# Patient Record
Sex: FEMALE | Race: BLACK OR AFRICAN AMERICAN | NOT HISPANIC OR LATINO | Employment: FULL TIME | URBAN - NONMETROPOLITAN AREA
[De-identification: names, ages, dates, MRNs, and addresses within clinical notes are randomized per-mention and may not be internally consistent; named-entity substitution may affect disease eponyms.]

---

## 2022-10-30 ENCOUNTER — APPOINTMENT (EMERGENCY)
Dept: CT IMAGING | Facility: HOSPITAL | Age: 47
End: 2022-10-30

## 2022-10-30 ENCOUNTER — APPOINTMENT (EMERGENCY)
Dept: RADIOLOGY | Facility: HOSPITAL | Age: 47
End: 2022-10-30

## 2022-10-30 ENCOUNTER — HOSPITAL ENCOUNTER (OUTPATIENT)
Facility: HOSPITAL | Age: 47
Setting detail: OBSERVATION
Discharge: HOME | End: 2022-10-31
Attending: EMERGENCY MEDICINE | Admitting: INTERNAL MEDICINE

## 2022-10-30 DIAGNOSIS — J45.901 EXACERBATION OF ASTHMA, UNSPECIFIED ASTHMA SEVERITY, UNSPECIFIED WHETHER PERSISTENT: Primary | ICD-10-CM

## 2022-10-30 PROBLEM — J45.41 MODERATE PERSISTENT ASTHMA WITH (ACUTE) EXACERBATION: Status: ACTIVE | Noted: 2022-10-30

## 2022-10-30 PROBLEM — R65.10 SIRS (SYSTEMIC INFLAMMATORY RESPONSE SYNDROME) (HCC): Status: ACTIVE | Noted: 2022-10-30

## 2022-10-30 PROBLEM — J96.01 ACUTE RESPIRATORY FAILURE WITH HYPOXIA (HCC): Status: ACTIVE | Noted: 2022-10-30

## 2022-10-30 PROBLEM — R73.9 HYPERGLYCEMIA: Status: ACTIVE | Noted: 2022-10-30

## 2022-10-30 LAB
ALBUMIN SERPL BCP-MCNC: 4 G/DL (ref 3.5–5)
ALP SERPL-CCNC: 118 U/L (ref 46–116)
ALT SERPL W P-5'-P-CCNC: 15 U/L (ref 12–78)
ANION GAP SERPL CALCULATED.3IONS-SCNC: 9 MMOL/L (ref 4–13)
AST SERPL W P-5'-P-CCNC: 18 U/L (ref 5–45)
BILIRUB SERPL-MCNC: 0.54 MG/DL (ref 0.2–1)
BUN SERPL-MCNC: 6 MG/DL (ref 5–25)
CALCIUM SERPL-MCNC: 9.6 MG/DL (ref 8.3–10.1)
CHLORIDE SERPL-SCNC: 102 MMOL/L (ref 96–108)
CO2 SERPL-SCNC: 26 MMOL/L (ref 21–32)
CREAT SERPL-MCNC: 0.92 MG/DL (ref 0.6–1.3)
ERYTHROCYTE [DISTWIDTH] IN BLOOD BY AUTOMATED COUNT: 14.6 % (ref 11.6–15.1)
FLUAV RNA RESP QL NAA+PROBE: NEGATIVE
FLUBV RNA RESP QL NAA+PROBE: NEGATIVE
GFR SERPL CREATININE-BSD FRML MDRD: 75 ML/MIN/1.73SQ M
GLUCOSE SERPL-MCNC: 169 MG/DL (ref 65–140)
HCT VFR BLD AUTO: 37.1 % (ref 34.8–46.1)
HGB BLD-MCNC: 13.3 G/DL (ref 11.5–15.4)
LACTATE SERPL-SCNC: 1.3 MMOL/L (ref 0.5–2)
LYMPHOCYTES # BLD AUTO: 6 % (ref 14–44)
MCH RBC QN AUTO: 29.5 PG (ref 26.8–34.3)
MCHC RBC AUTO-ENTMCNC: 35.8 G/DL (ref 31.4–37.4)
MCV RBC AUTO: 82 FL (ref 82–98)
MONOCYTES NFR BLD: 2 % (ref 4–12)
NEUTS SEG NFR BLD AUTO: 92 % (ref 43–75)
PLATELET # BLD AUTO: 486 THOUSANDS/UL (ref 149–390)
PLATELET # BLD AUTO: 514 THOUSANDS/UL (ref 149–390)
PLATELET BLD QL SMEAR: ADEQUATE
PMV BLD AUTO: 9.2 FL (ref 8.9–12.7)
PMV BLD AUTO: 9.5 FL (ref 8.9–12.7)
POTASSIUM SERPL-SCNC: 4.2 MMOL/L (ref 3.5–5.3)
PROCALCITONIN SERPL-MCNC: <0.05 NG/ML
PROT SERPL-MCNC: 7.8 G/DL (ref 6.4–8.4)
RBC # BLD AUTO: 4.51 MILLION/UL (ref 3.81–5.12)
RBC MORPH BLD: NORMAL
RSV RNA RESP QL NAA+PROBE: NEGATIVE
SARS-COV-2 RNA RESP QL NAA+PROBE: NEGATIVE
SODIUM SERPL-SCNC: 137 MMOL/L (ref 135–147)
TOTAL CELLS COUNTED SPEC: 100
WBC # BLD AUTO: 21.95 THOUSAND/UL (ref 4.31–10.16)

## 2022-10-30 RX ORDER — SODIUM CHLORIDE FOR INHALATION 0.9 %
3 VIAL, NEBULIZER (ML) INHALATION ONCE
Status: COMPLETED | OUTPATIENT
Start: 2022-10-30 | End: 2022-10-30

## 2022-10-30 RX ORDER — ACETAMINOPHEN 325 MG/1
650 TABLET ORAL EVERY 6 HOURS PRN
Status: DISCONTINUED | OUTPATIENT
Start: 2022-10-30 | End: 2022-10-31 | Stop reason: HOSPADM

## 2022-10-30 RX ORDER — ENOXAPARIN SODIUM 100 MG/ML
40 INJECTION SUBCUTANEOUS DAILY
Status: DISCONTINUED | OUTPATIENT
Start: 2022-10-31 | End: 2022-10-31 | Stop reason: HOSPADM

## 2022-10-30 RX ORDER — MONTELUKAST SODIUM 10 MG/1
10 TABLET ORAL
COMMUNITY
End: 2022-10-31 | Stop reason: SDUPTHER

## 2022-10-30 RX ORDER — LORATADINE 10 MG/1
10 TABLET ORAL DAILY
COMMUNITY

## 2022-10-30 RX ORDER — LEVALBUTEROL 1.25 MG/.5ML
1.25 SOLUTION, CONCENTRATE RESPIRATORY (INHALATION)
Status: DISCONTINUED | OUTPATIENT
Start: 2022-10-30 | End: 2022-10-31 | Stop reason: HOSPADM

## 2022-10-30 RX ORDER — GUAIFENESIN 600 MG/1
600 TABLET, EXTENDED RELEASE ORAL 2 TIMES DAILY
Status: DISCONTINUED | OUTPATIENT
Start: 2022-10-30 | End: 2022-10-31 | Stop reason: HOSPADM

## 2022-10-30 RX ORDER — ALBUTEROL SULFATE 2.5 MG/3ML
2.5 SOLUTION RESPIRATORY (INHALATION) EVERY 2 HOUR PRN
Status: DISCONTINUED | OUTPATIENT
Start: 2022-10-30 | End: 2022-10-31 | Stop reason: HOSPADM

## 2022-10-30 RX ORDER — FERROUS SULFATE 325(65) MG
325 TABLET ORAL
COMMUNITY

## 2022-10-30 RX ORDER — SODIUM CHLORIDE FOR INHALATION 0.9 %
3 VIAL, NEBULIZER (ML) INHALATION
Status: DISCONTINUED | OUTPATIENT
Start: 2022-10-30 | End: 2022-10-30

## 2022-10-30 RX ORDER — METHYLPREDNISOLONE SODIUM SUCCINATE 40 MG/ML
40 INJECTION, POWDER, LYOPHILIZED, FOR SOLUTION INTRAMUSCULAR; INTRAVENOUS EVERY 6 HOURS SCHEDULED
Status: DISCONTINUED | OUTPATIENT
Start: 2022-10-30 | End: 2022-10-31

## 2022-10-30 RX ORDER — BUDESONIDE AND FORMOTEROL FUMARATE DIHYDRATE 80; 4.5 UG/1; UG/1
2 AEROSOL RESPIRATORY (INHALATION)
Status: DISCONTINUED | OUTPATIENT
Start: 2022-10-30 | End: 2022-10-31 | Stop reason: HOSPADM

## 2022-10-30 RX ORDER — MONTELUKAST SODIUM 10 MG/1
10 TABLET ORAL
Status: DISCONTINUED | OUTPATIENT
Start: 2022-10-30 | End: 2022-10-31 | Stop reason: HOSPADM

## 2022-10-30 RX ORDER — ALBUTEROL SULFATE 2.5 MG/3ML
2.5 SOLUTION RESPIRATORY (INHALATION) EVERY 6 HOURS PRN
COMMUNITY
End: 2022-10-31 | Stop reason: SDUPTHER

## 2022-10-30 RX ADMIN — IOHEXOL 100 ML: 350 INJECTION, SOLUTION INTRAVENOUS at 16:10

## 2022-10-30 RX ADMIN — MONTELUKAST 10 MG: 10 TABLET, FILM COATED ORAL at 22:01

## 2022-10-30 RX ADMIN — LEVALBUTEROL HYDROCHLORIDE 1.25 MG: 1.25 SOLUTION, CONCENTRATE RESPIRATORY (INHALATION) at 20:07

## 2022-10-30 RX ADMIN — ALBUTEROL SULFATE 2.5 MG: 2.5 SOLUTION RESPIRATORY (INHALATION) at 18:52

## 2022-10-30 RX ADMIN — METHYLPREDNISOLONE SODIUM SUCCINATE 40 MG: 40 INJECTION, POWDER, FOR SOLUTION INTRAMUSCULAR; INTRAVENOUS at 23:32

## 2022-10-30 RX ADMIN — IPRATROPIUM BROMIDE 1 MG: 0.5 SOLUTION RESPIRATORY (INHALATION) at 14:19

## 2022-10-30 RX ADMIN — METHYLPREDNISOLONE SODIUM SUCCINATE 40 MG: 40 INJECTION, POWDER, FOR SOLUTION INTRAMUSCULAR; INTRAVENOUS at 18:00

## 2022-10-30 RX ADMIN — GUAIFENESIN 600 MG: 600 TABLET, EXTENDED RELEASE ORAL at 18:00

## 2022-10-30 RX ADMIN — IPRATROPIUM BROMIDE 0.5 MG: 0.5 SOLUTION RESPIRATORY (INHALATION) at 20:07

## 2022-10-30 RX ADMIN — ALBUTEROL SULFATE 10 MG: 2.5 SOLUTION RESPIRATORY (INHALATION) at 14:19

## 2022-10-30 RX ADMIN — BUDESONIDE AND FORMOTEROL FUMARATE DIHYDRATE 2 PUFF: 80; 4.5 AEROSOL RESPIRATORY (INHALATION) at 20:05

## 2022-10-30 RX ADMIN — ALBUTEROL SULFATE 2.5 MG: 2.5 SOLUTION RESPIRATORY (INHALATION) at 22:01

## 2022-10-30 RX ADMIN — ACETAMINOPHEN 650 MG: 325 TABLET ORAL at 18:34

## 2022-10-30 RX ADMIN — ISODIUM CHLORIDE 3 ML: 0.03 SOLUTION RESPIRATORY (INHALATION) at 14:19

## 2022-10-30 NOTE — ASSESSMENT & PLAN NOTE
Present on admission as evidenced by tachycardia, tachypnea, leukocytosis  COVID influenza and RSV swab negative on admission  CT chest shows  Likely in the setting of acute asthma exacerbation    Leukocytosis can be secondary to recent IV steroids patient received in the emergency room yesterday  Continue to follow CBC temperature curve closely  Will get lactic acid and procalcitonin, continue to monitor off IV antibiotics

## 2022-10-30 NOTE — ASSESSMENT & PLAN NOTE
Patient has history of asthma  Maintained on Singulair, albuterol inhaler,  Does not follow up with pulmonologist  Was in the emergency room at [facility] yesterday with exacerbation  Received IV steroids and discharged on tapering prednisone and azithromycin  Presents back with worsening shortness of breath and cough  Chest x-ray on admission without acute infiltrate  CT chest shows:  No pulmonary embolism  Right middle lobe groundglass opacities can represent atelectasis or (viral) pneumonia  Continue with scheduled IV Solu-Medrol, scheduled DuoNebs q 6 hours, Singulair,  Will benefit from outpatient pulmonary follow-up

## 2022-10-30 NOTE — ED PROVIDER NOTES
History  Chief Complaint   Patient presents with   • Shortness of Breath     Pt states asthma attack started yesterday  Went to hospital yesterday but still sob today  Patient with history of asthma, complains of 2 days of wheezing, coughing, shortness of breath  Went yesterday to [facility] where she was administered nebulization and steroid, discharged after same and prescribed Z-Eriberto and additional steroid  However, continues to complain of coughing and significant shortness of breath and wheezing, returns to the emergency room for re-evaluation  Denies fevers or chills  Denies chest pain  History provided by:  Patient   used: No    Shortness of Breath  Severity:  Moderate  Onset quality:  Gradual  Duration:  2 days  Timing:  Constant  Progression:  Unchanged  Chronicity:  New  Relieved by:  Nothing  Worsened by:  Nothing  Ineffective treatments: nebs/steroids  Associated symptoms: cough and wheezing    Associated symptoms: no abdominal pain, no chest pain, no ear pain, no fever, no headaches, no hemoptysis, no neck pain, no rash, no sore throat, no syncope and no vomiting        None       Past Medical History:   Diagnosis Date   • Asthma        Past Surgical History:   Procedure Laterality Date   • CHOLECYSTECTOMY     • GASTRIC BYPASS         History reviewed  No pertinent family history  I have reviewed and agree with the history as documented  E-Cigarette/Vaping   • E-Cigarette Use Never User      E-Cigarette/Vaping Substances     Social History     Tobacco Use   • Smoking status: Never Smoker   • Smokeless tobacco: Never Used   Vaping Use   • Vaping Use: Never used   Substance Use Topics   • Alcohol use: Yes   • Drug use: Yes     Types: Marijuana       Review of Systems   Constitutional: Negative for chills and fever  HENT: Negative for ear pain, hearing loss, sore throat, trouble swallowing and voice change  Eyes: Negative for pain and discharge  Respiratory: Positive for cough, shortness of breath and wheezing  Negative for hemoptysis  Cardiovascular: Negative for chest pain, palpitations and syncope  Gastrointestinal: Negative for abdominal pain, blood in stool, constipation, diarrhea, nausea and vomiting  Genitourinary: Negative for dysuria, flank pain, frequency and hematuria  Musculoskeletal: Negative for joint swelling, neck pain and neck stiffness  Skin: Negative for rash and wound  Neurological: Negative for dizziness, seizures, syncope, facial asymmetry and headaches  Psychiatric/Behavioral: Negative for hallucinations, self-injury and suicidal ideas  All other systems reviewed and are negative  Physical Exam  Physical Exam  Vitals and nursing note reviewed  Constitutional:       General: She is not in acute distress  Appearance: She is well-developed  HENT:      Head: Normocephalic and atraumatic  Right Ear: External ear normal       Left Ear: External ear normal    Eyes:      General: No scleral icterus  Right eye: No discharge  Left eye: No discharge  Extraocular Movements: Extraocular movements intact  Conjunctiva/sclera: Conjunctivae normal    Cardiovascular:      Rate and Rhythm: Normal rate and regular rhythm  Heart sounds: Normal heart sounds  No murmur heard  Pulmonary:      Effort: Pulmonary effort is normal       Breath sounds: Examination of the right-upper field reveals decreased breath sounds and wheezing  Examination of the left-upper field reveals decreased breath sounds and wheezing  Examination of the right-middle field reveals decreased breath sounds and wheezing  Examination of the left-middle field reveals decreased breath sounds and wheezing  Examination of the right-lower field reveals decreased breath sounds and wheezing  Examination of the left-lower field reveals decreased breath sounds and wheezing  Decreased breath sounds and wheezing present   No rhonchi or rales  Abdominal:      General: Bowel sounds are normal  There is no distension  Palpations: Abdomen is soft  Tenderness: There is no abdominal tenderness  There is no guarding or rebound  Musculoskeletal:         General: No deformity  Normal range of motion  Cervical back: Normal range of motion and neck supple  Skin:     General: Skin is warm and dry  Findings: No rash  Neurological:      General: No focal deficit present  Mental Status: She is alert and oriented to person, place, and time  Cranial Nerves: No cranial nerve deficit  Psychiatric:         Mood and Affect: Mood normal          Behavior: Behavior normal          Thought Content:  Thought content normal          Judgment: Judgment normal          Vital Signs  ED Triage Vitals   Temperature Pulse Respirations Blood Pressure SpO2   10/30/22 1409 10/30/22 1409 10/30/22 1409 10/30/22 1410 10/30/22 1409   97 6 °F (36 4 °C) (!) 130 (!) 30 (!) 187/108 (!) 89 %      Temp Source Heart Rate Source Patient Position - Orthostatic VS BP Location FiO2 (%)   10/30/22 1409 10/30/22 1409 -- -- --   Temporal Monitor         Pain Score       10/30/22 1500       No Pain           Vitals:    10/30/22 1409 10/30/22 1410 10/30/22 1415 10/30/22 1500   BP:  (!) 187/108 (!) 187/108 149/80   Pulse: (!) 130  (!) 117 (!) 115         Visual Acuity      ED Medications  Medications   albuterol inhalation solution 10 mg (10 mg Nebulization Given 10/30/22 1419)     And   ipratropium (ATROVENT) 0 02 % inhalation solution 1 mg (1 mg Nebulization Given 10/30/22 1419)     And   sodium chloride 0 9 % inhalation solution 3 mL (3 mL Nebulization Given 10/30/22 1419)   iohexol (OMNIPAQUE) 350 MG/ML injection (SINGLE-DOSE) 100 mL (100 mL Intravenous Given 10/30/22 1610)       Diagnostic Studies  Results Reviewed     Procedure Component Value Units Date/Time    Manual Differential(PHLEBS Do Not Order) [[id number]]  (Abnormal) Collected: 10/30/22 6309 Lab Status: Final result Specimen: Blood from Arm, Right Updated: 10/30/22 1508     Segmented % 92 %      Lymphocytes % 6 %      Monocytes % 2 %      Total Counted 100     RBC Morphology Normal     Platelet Estimate Adequate    COVID19, Influenza A/B, RSV PCR, MANPREET [[id number]]  (Normal) Collected: 10/30/22 1417    Lab Status: Final result Specimen: Nares from Nose Updated: 10/30/22 1459     SARS-CoV-2 Negative     INFLUENZA A PCR Negative     INFLUENZA B PCR Negative     RSV PCR Negative    Narrative:      FOR PEDIATRIC PATIENTS - copy/paste COVID Guidelines URL to browser: [email or web address]  [employer]    SARS-CoV-2 assay is a Nucleic Acid Amplification assay intended for the  qualitative detection of nucleic acid from SARS-CoV-2 in nasopharyngeal  swabs  Results are for the presumptive identification of SARS-CoV-2 RNA  Positive results are indicative of infection with SARS-CoV-2, the virus  causing COVID-19, but do not rule out bacterial infection or co-infection  with other viruses  Laboratories within the [place] and its  territories are required to report all positive results to the appropriate  public health authorities  Negative results do not preclude SARS-CoV-2  infection and should not be used as the sole basis for treatment or other  patient management decisions  Negative results must be combined with  clinical observations, patient history, and epidemiological information  This test has not been FDA cleared or approved  This test has been authorized by FDA under an Emergency Use Authorization  (EUA)  This test is only authorized for the duration of time the  declaration that circumstances exist justifying the authorization of the  emergency use of an in vitro diagnostic tests for detection of SARS-CoV-2  virus and/or diagnosis of COVID-19 infection under section 564(b)(1) of  the Act, 21 U  S C  885ZIV-6(T)(3), unless the authorization is terminated  or revoked sooner  The test has been validated but independent review by FDA  and CLIA is pending  Test performed using [employer] GeneXpert: This RT-PCR assay targets N2,  a region unique to SARS-CoV-2  A conserved region in the E-gene was chosen  for pan-Sarbecovirus detection which includes SARS-CoV-2  According to CMS-2020-01-R, this platform meets the definition of high-throughput technology  Comprehensive metabolic panel [[id number]]  (Abnormal) Collected: 10/30/22 1417    Lab Status: Final result Specimen: Blood from Arm, Right Updated: 10/30/22 1442     Sodium 137 mmol/L      Potassium 4 2 mmol/L      Chloride 102 mmol/L      CO2 26 mmol/L      ANION GAP 9 mmol/L      BUN 6 mg/dL      Creatinine 0 92 mg/dL      Glucose 169 mg/dL      Calcium 9 6 mg/dL      AST 18 U/L      ALT 15 U/L      Alkaline Phosphatase 118 U/L      Total Protein 7 8 g/dL      Albumin 4 0 g/dL      Total Bilirubin 0 54 mg/dL      eGFR 75 ml/min/1 73sq m     Narrative:      [place] guidelines for Chronic Kidney Disease (CKD):   •  Stage 1 with normal or high GFR (GFR > 90 mL/min/1 73 square meters)  •  Stage 2 Mild CKD (GFR = 60-89 mL/min/1 73 square meters)  •  Stage 3A Moderate CKD (GFR = 45-59 mL/min/1 73 square meters)  •  Stage 3B Moderate CKD (GFR = 30-44 mL/min/1 73 square meters)  •  Stage 4 Severe CKD (GFR = 15-29 mL/min/1 73 square meters)  •  Stage 5 End Stage CKD (GFR <15 mL/min/1 73 square meters)  Note: GFR calculation is accurate only with a steady state creatinine    CBC and differential [[id number]]  (Abnormal) Collected: 10/30/22 1417    Lab Status: Final result Specimen: Blood from Arm, Right Updated: 10/30/22 1424     WBC 21 95 Thousand/uL      RBC 4 51 Million/uL      Hemoglobin 13 3 g/dL      Hematocrit 37 1 %      MCV 82 fL      MCH 29 5 pg      MCHC 35 8 g/dL      RDW 14 6 %      MPV 9 2 fL      Platelets 121 Thousands/uL     Narrative: This is an appended report  These results have been appended to a previously verified report  CTA ed chest pe study   Final Result by Esdras Mendoza MD (10/30 6595)      1  No pulmonary embolism  2   Right middle lobe groundglass opacities can represent atelectasis or (viral) pneumonia  However, noted is made of patient's negative COVID 23, influenza, and RSV results  Patient's leukocytosis is noted  Workstation performed: [id number]         XR chest portable   ED Interpretation by Estrada Inman MD (10/30 6151)   No acute finding                 Procedures  Procedures         ED Course  ED Course as of 10/30/22 1703   Sun Oct 30, 2022   1421 [facility] requests CT chest prior to disposition decision  This has been ordered   1701 Patient does desaturate to high 80s off oxygen  Will place on observation, medical service                                               MDM  Number of Diagnoses or Management Options     Amount and/or Complexity of Data Reviewed  Clinical lab tests: ordered and reviewed  Tests in the radiology section of CPT®: ordered and reviewed  Decide to obtain previous medical records or to obtain history from someone other than the patient: yes  Review and summarize past medical records: yes  Independent visualization of images, tracings, or specimens: yes        Disposition  Final diagnoses:   Exacerbation of asthma, unspecified asthma severity, unspecified whether persistent     Time reflects when diagnosis was documented in both MDM as applicable and the Disposition within this note     Time User Action Codes Description Comment    10/30/2022  5:01 PM Gurvinder Cedillo Add [J45 901] Exacerbation of asthma, unspecified asthma severity, unspecified whether persistent       ED Disposition     ED Disposition   Admit    Condition   Stable    Date/Time   Sun Oct 30, 2022  5:00 PM    Comment              Follow-up Information    None         Patient's Medications    No medications on file No discharge procedures on file      PDMP Review     None          ED Provider  Electronically Signed by           Magda Keys MD  10/30/22 3276

## 2022-10-30 NOTE — H&P
[address]  H&P- Jaynee Bumpers 1975, 41 y o  female MRN: [id number]  Unit/Bed#: ED 06 Encounter: [id number]  Primary Care Provider: No primary care provider on file  Date and time admitted to hospital: 10/30/2022  2:07 PM    * Moderate persistent asthma with (acute) exacerbation  Assessment & Plan  Patient has history of asthma  Maintained on Singulair, albuterol inhaler,  Does not follow up with pulmonologist  Was in the emergency room at [facility] yesterday with exacerbation  Received IV steroids and discharged on tapering prednisone and azithromycin  Presents back with worsening shortness of breath and cough  Chest x-ray on admission without acute infiltrate  CT chest shows:  No pulmonary embolism  Right middle lobe groundglass opacities can represent atelectasis or (viral) pneumonia  Continue with scheduled IV Solu-Medrol, scheduled DuoNebs q 6 hours, Singulair,  Will benefit from outpatient pulmonary follow-up    Hyperglycemia  Assessment & Plan  Random blood glucose of 169 on blood work  Patient is not a diabetic  Will follow-up on hemoglobin A1c and continue with serialAccu-Cheks in the setting of ongoing steroid use    SIRS (systemic inflammatory response syndrome) (HCC)  Assessment & Plan  Present on admission as evidenced by tachycardia, tachypnea, leukocytosis  COVID influenza and RSV swab negative on admission  CT chest shows  Likely in the setting of acute asthma exacerbation    Leukocytosis can be secondary to recent IV steroids patient received in the emergency room yesterday  Continue to follow CBC temperature curve closely  Will get lactic acid and procalcitonin, continue to monitor off IV antibiotics    Acute respiratory failure with hypoxia [facility])  Assessment & Plan  Patient presents with worsening shortness of breath and cough  Tachypneic tachycardic with increased work of breathing on admission  Oxygen saturation 89% on room air  Currently on 2 L of supplemental oxygen but desaturates in the mid 80s on ambulation on room air  Wean and titrate as tolerated to keep saturation between 88-94%  VTE Pharmacologic Prophylaxis: VTE Score: 4 Moderate Risk (Score 3-4) - Pharmacological DVT Prophylaxis Ordered: enoxaparin (Lovenox)  Code Status:  Full code  Discussion with family: Updated  (mother) at bedside  Anticipated Length of Stay: Patient will be admitted on an observation basis with an anticipated length of stay of less than 2 midnights secondary to Management of acute asthma exacerbation and hypoxia  Total Time for Visit, including Counseling / Coordination of Care: 70 minutes Greater than 50% of this total time spent on direct patient counseling and coordination of care  Chief Complaint:  Shortness of breath    History of Present Illness:  Travis Mars is a 41 y o  female with a PMH of asthma, anemia who presents with worsening shortness of breath for 1 day  Patient reports ports nephew at home with URI symptoms  Started having dry cough and runny nose and shortness of breath yesterday  Went to the emergency room at [facility] and was discharged on tapering prednisone this morning at 1:30 a m   Subsequently upon going home had continued shortness of breath and wheezing thereby prompting her to return back to the emergency room  Denies any fever but admitted to having chills  Denies any productive cough  Still admits to wheezing and shortness of breath at rest and with exertion  Denies any chest pain  Denies any recent travel  Denies any prior history of mechanical ventilation  Last admission for asthma exacerbation approximately 4 years ago  Review of Systems:  Review of Systems   Constitutional: Positive for activity change and chills  HENT: Positive for congestion and rhinorrhea  Eyes: Negative  Respiratory: Positive for cough, chest tightness and shortness of breath      Cardiovascular: Negative  Gastrointestinal: Negative  Endocrine: Negative  Genitourinary: Negative  Musculoskeletal: Negative  Skin: Negative  Allergic/Immunologic: Negative  Neurological: Negative  Hematological: Negative  Psychiatric/Behavioral: Negative  Past Medical and Surgical History:   Past Medical History:   Diagnosis Date   • Asthma        Past Surgical History:   Procedure Laterality Date   • CHOLECYSTECTOMY     • GASTRIC BYPASS         Meds/Allergies:  Prior to Admission medications    Not on File     I have reviewed home medications with patient personally  Allergies: Allergies   Allergen Reactions   • Alprazolam Other (See Comments)     See 11/06/09 telephone encounter  See 11/06/09 telephone encounter         Social History:  Marital Status: Single     Substance Use History:   Social History     Substance and Sexual Activity   Alcohol Use Yes     Social History     Tobacco Use   Smoking Status Never Smoker   Smokeless Tobacco Never Used     Social History     Substance and Sexual Activity   Drug Use Yes   • Types: Marijuana       Family History:  History reviewed  No pertinent family history  Physical Exam:     Vitals:   Blood Pressure: 134/68 (10/30/22 1700)  Pulse: (!) 109 (10/30/22 1700)  Temperature: 97 6 °F (36 4 °C) (10/30/22 1409)  Temp Source: Temporal (10/30/22 1409)  Respirations: 16 (10/30/22 1700)  Weight - Scale: 113 kg (249 lb 12 5 oz) (10/30/22 1409)  SpO2: 94 % (10/30/22 1700)    Physical Exam  Constitutional:       General: She is not in acute distress  Appearance: She is not ill-appearing  HENT:      Head: Normocephalic  Nose: Nose normal       Mouth/Throat:      Mouth: Mucous membranes are moist    Eyes:      Extraocular Movements: Extraocular movements intact  Pupils: Pupils are equal, round, and reactive to light  Cardiovascular:      Rate and Rhythm: Regular rhythm  Tachycardia present  Pulmonary:      Breath sounds: Wheezing present  Comments: Reduced air entry with diffuse wheezing bilateral lung fields  Patient able to talk in complete sentences  Abdominal:      General: Abdomen is flat  Bowel sounds are normal       Palpations: Abdomen is soft  Musculoskeletal:         General: No swelling or tenderness  Cervical back: Neck supple  Right lower leg: No edema  Left lower leg: No edema  Skin:     General: Skin is warm  Neurological:      General: No focal deficit present  Mental Status: She is alert and oriented to person, place, and time  Mental status is at baseline  Psychiatric:         Mood and Affect: Mood normal           Additional Data:     Lab Results:  Results from last 7 days   Lab Units 10/30/22  1417   WBC Thousand/uL 21 95*   HEMOGLOBIN g/dL 13 3   HEMATOCRIT % 37 1   PLATELETS Thousands/uL 514*   LYMPHO PCT % 6*   MONO PCT % 2*     Results from last 7 days   Lab Units 10/30/22  1417   SODIUM mmol/L 137   POTASSIUM mmol/L 4 2   CHLORIDE mmol/L 102   CO2 mmol/L 26   BUN mg/dL 6   CREATININE mg/dL 0 92   ANION GAP mmol/L 9   CALCIUM mg/dL 9 6   ALBUMIN g/dL 4 0   TOTAL BILIRUBIN mg/dL 0 54   ALK PHOS U/L 118*   ALT U/L 15   AST U/L 18   GLUCOSE RANDOM mg/dL 169*                       Imaging: Reviewed radiology reports from this admission including: chest CT scan  CTA ed chest pe study   Final Result by Diane Hylton MD (10/30 1875)      1  No pulmonary embolism  2   Right middle lobe groundglass opacities can represent atelectasis or (viral) pneumonia  However, noted is made of patient's negative COVID 23, influenza, and RSV results  Patient's leukocytosis is noted  Workstation performed: [id number]         XR chest portable   ED Interpretation by Carlton Pope MD (10/30 6076)   No acute finding          EKG and Other Studies Reviewed on Admission:   · EKG: Sinus Tachycardia       ** Please Note: This note has been constructed using a voice recognition system   **

## 2022-10-30 NOTE — ASSESSMENT & PLAN NOTE
Random blood glucose of 169 on blood work  Patient is not a diabetic    Will follow-up on hemoglobin A1c and continue with serialAccu-Cheks in the setting of ongoing steroid use

## 2022-10-30 NOTE — ASSESSMENT & PLAN NOTE
Patient presents with worsening shortness of breath and cough  Tachypneic tachycardic with increased work of breathing on admission  Oxygen saturation 89% on room air  Currently on 2 L of supplemental oxygen but desaturates in the mid 80s on ambulation on room air  Wean and titrate as tolerated to keep saturation between 88-94%

## 2022-10-31 VITALS
SYSTOLIC BLOOD PRESSURE: 165 MMHG | DIASTOLIC BLOOD PRESSURE: 91 MMHG | TEMPERATURE: 97.6 F | HEART RATE: 108 BPM | WEIGHT: 249.78 LBS | RESPIRATION RATE: 20 BRPM | OXYGEN SATURATION: 93 %

## 2022-10-31 LAB
ANION GAP SERPL CALCULATED.3IONS-SCNC: 8 MMOL/L (ref 4–13)
BUN SERPL-MCNC: 7 MG/DL (ref 5–25)
CALCIUM SERPL-MCNC: 9.1 MG/DL (ref 8.3–10.1)
CHLORIDE SERPL-SCNC: 104 MMOL/L (ref 96–108)
CO2 SERPL-SCNC: 27 MMOL/L (ref 21–32)
CREAT SERPL-MCNC: 0.8 MG/DL (ref 0.6–1.3)
DME PARACHUTE DELIVERY DATE REQUESTED: NORMAL
DME PARACHUTE DELIVERY NOTE: NORMAL
DME PARACHUTE ITEM DESCRIPTION: NORMAL
DME PARACHUTE ORDER STATUS: NORMAL
DME PARACHUTE SUPPLIER NAME: NORMAL
DME PARACHUTE SUPPLIER PHONE: NORMAL
ERYTHROCYTE [DISTWIDTH] IN BLOOD BY AUTOMATED COUNT: 15 % (ref 11.6–15.1)
EST. AVERAGE GLUCOSE BLD GHB EST-MCNC: 103 MG/DL
GFR SERPL CREATININE-BSD FRML MDRD: 89 ML/MIN/1.73SQ M
GLUCOSE P FAST SERPL-MCNC: 155 MG/DL (ref 65–99)
GLUCOSE SERPL-MCNC: 155 MG/DL (ref 65–140)
HBA1C MFR BLD: 5.2 %
HCT VFR BLD AUTO: 36.2 % (ref 34.8–46.1)
HGB BLD-MCNC: 12.6 G/DL (ref 11.5–15.4)
MCH RBC QN AUTO: 29.3 PG (ref 26.8–34.3)
MCHC RBC AUTO-ENTMCNC: 34.8 G/DL (ref 31.4–37.4)
MCV RBC AUTO: 84 FL (ref 82–98)
PLATELET # BLD AUTO: 405 THOUSANDS/UL (ref 149–390)
PMV BLD AUTO: 9.9 FL (ref 8.9–12.7)
POTASSIUM SERPL-SCNC: 5.2 MMOL/L (ref 3.5–5.3)
RBC # BLD AUTO: 4.3 MILLION/UL (ref 3.81–5.12)
SODIUM SERPL-SCNC: 139 MMOL/L (ref 135–147)
WBC # BLD AUTO: 20.94 THOUSAND/UL (ref 4.31–10.16)

## 2022-10-31 RX ORDER — MONTELUKAST SODIUM 10 MG/1
10 TABLET ORAL
Qty: 30 TABLET | Refills: 0 | Status: SHIPPED | OUTPATIENT
Start: 2022-10-31 | End: 2022-11-30

## 2022-10-31 RX ORDER — BUDESONIDE AND FORMOTEROL FUMARATE DIHYDRATE 80; 4.5 UG/1; UG/1
2 AEROSOL RESPIRATORY (INHALATION)
Qty: 10.2 G | Refills: 0 | Status: SHIPPED | OUTPATIENT
Start: 2022-10-31

## 2022-10-31 RX ORDER — BENZONATATE 100 MG/1
100 CAPSULE ORAL 3 TIMES DAILY PRN
Qty: 20 CAPSULE | Refills: 0 | Status: SHIPPED | OUTPATIENT
Start: 2022-10-31

## 2022-10-31 RX ORDER — BENZONATATE 100 MG/1
100 CAPSULE ORAL 3 TIMES DAILY PRN
Status: DISCONTINUED | OUTPATIENT
Start: 2022-10-31 | End: 2022-10-31 | Stop reason: HOSPADM

## 2022-10-31 RX ORDER — PREDNISONE 20 MG/1
TABLET ORAL
Qty: 15 TABLET | Refills: 0 | Status: SHIPPED | OUTPATIENT
Start: 2022-11-01 | End: 2022-11-13

## 2022-10-31 RX ORDER — ALBUTEROL SULFATE 2.5 MG/3ML
2.5 SOLUTION RESPIRATORY (INHALATION) EVERY 6 HOURS PRN
Qty: 3 ML | Refills: 0 | Status: SHIPPED | OUTPATIENT
Start: 2022-10-31 | End: 2022-11-30

## 2022-10-31 RX ADMIN — LEVALBUTEROL HYDROCHLORIDE 1.25 MG: 1.25 SOLUTION, CONCENTRATE RESPIRATORY (INHALATION) at 08:22

## 2022-10-31 RX ADMIN — ENOXAPARIN SODIUM 40 MG: 40 INJECTION SUBCUTANEOUS at 09:05

## 2022-10-31 RX ADMIN — BUDESONIDE AND FORMOTEROL FUMARATE DIHYDRATE 2 PUFF: 80; 4.5 AEROSOL RESPIRATORY (INHALATION) at 07:57

## 2022-10-31 RX ADMIN — IPRATROPIUM BROMIDE 0.5 MG: 0.5 SOLUTION RESPIRATORY (INHALATION) at 08:22

## 2022-10-31 RX ADMIN — METHYLPREDNISOLONE SODIUM SUCCINATE 40 MG: 40 INJECTION, POWDER, FOR SOLUTION INTRAMUSCULAR; INTRAVENOUS at 05:14

## 2022-10-31 RX ADMIN — ALBUTEROL SULFATE 2.5 MG: 2.5 SOLUTION RESPIRATORY (INHALATION) at 05:20

## 2022-10-31 RX ADMIN — BENZONATATE 100 MG: 100 CAPSULE ORAL at 06:16

## 2022-10-31 RX ADMIN — GUAIFENESIN 600 MG: 600 TABLET, EXTENDED RELEASE ORAL at 09:05

## 2022-10-31 NOTE — CASE MANAGEMENT
Case Management Progress Note    Patient name Ann Booth  Location ED 06/ED 06 MRN [id number]  : 1975 Date 10/31/2022       LOS (days): 0  Geometric Mean LOS (GMLOS) (days):   Days to GMLOS:        OBJECTIVE:        Current admission status: Observation  Preferred Pharmacy:   [address]  [address] [place] [address]  Phone: [phone] Fax: [phone]    Primary Care Provider: Beverley Abdullahi DO    Primary Insurance: BLUE CROSS  Secondary Insurance:     PROGRESS NOTE:      Pt requires O2 2L at all times  CM discussed home oxygen with pt, pt does not have a preference to any DME company  Cm placing referral to [facility] in [address] [place] [address] of choice was provided in regards to needing a home medical equipment company, pt does not have preference  Pt is aware that we frequently utilized [employer] as we have a working relationship with this company  Patients choice is to use [employer]      CM making a PCP follow up appointment for pt on 22 at 0945, AVS updated    CM delivering home O2 [employer] concentrator and nebulizer to pts bedside

## 2022-10-31 NOTE — DISCHARGE INSTRUCTIONS
[facility] DEPARTMENT  [facility] [address]  [place] [address] [address]  Dept: [phone]    October 31, 2022     Patient: Katia Marin   YOB: 1975   Date of Visit: 10/30/2022       To Whom it May Concern:    Katia Marin is under my professional care  She was seen in the hospital from 10/30/2022   to 10/31/22  She may return to work on November 3rd, 2022  without limitations  If you have any questions or concerns, please don't hesitate to call           Sincerely,          DARION Arnold

## 2022-10-31 NOTE — DISCHARGE SUMMARY
[address]  Discharge- Tyesha Núñez 1975, 41 y o  female MRN: [id number]  Unit/Bed#: ED 06 Encounter: [id number]  Primary Care Provider: Desean Costa DO   Date and time admitted to hospital: 10/30/2022  2:07 PM    * Moderate persistent asthma with (acute) exacerbation  Assessment & Plan  Background:  Presented to the ED with worsening shortness of breath and cough  Of note was in the ED [facility] yesterday with exacerbation  Received IV steroids and discharged on tapering prednisone and azithromycin  · Known history of asthma maintained on Singulair and albuterol inhaler   · Does not follow up with pulmonologist  · Chest x-ray on admission without acute infiltrate  · CT chest shows:  No pulmonary embolism  Right middle lobe groundglass opacities can represent atelectasis or (viral) pneumonia  · Continue with scheduled IV Solu-Medrol, scheduled DuoNebs q 6 hours, Singulair  · Ambulatory referral to pulmonology at discharge  · Home O2 evaluation qualifying for oxygen  · Arranged with Case Management prior to discharge  · Sent on steroid taper as well as continuation of Zithromax per prior prescription  · Also wrote prescriptions for nebulizer as patient did not use her nebulizer at home      Acute respiratory failure with hypoxia ([facility] )  Assessment & Plan  · Patient presents with worsening shortness of breath and cough  · Tachypneic tachycardic with increased work of breathing on admission  · Oxygen saturation 89% on room air  · Currently on 2 L of supplemental oxygen but desaturates in the mid 80s on ambulation on room air  · Home O2 qualifying for oxygen  · Low suspicion for indefinite oxygen use  · Ongoing outpatient follow-up primary care and pulmonology    SIRS (systemic inflammatory response syndrome) ([facility] )  Assessment & Plan  · Present on admission as evidenced by tachycardia, tachypnea, leukocytosis  · COVID influenza and RSV swab negative on admission  · CT chest reviewed  · Likely in the setting of acute asthma exacerbation  Leukocytosis can be secondary to recent IV steroids patient received in the emergency room yesterday  · Continue to follow CBC temperature curve closely  · Labs reviewed  · No indication for additional antibiotics at this time; did recommend continuation of Zithromax upon discharge    Hyperglycemia  Assessment & Plan  · Random blood glucose of 169 on blood work; no known history of diabetes  · Likely in the setting of steroid use  · Follow-up outpatient primary care doctor      Discharging Physician / Practitioner: Hugh Castro  PCP: Maki Keenan DO  Admission Date:   Admission Orders (From admission, onward)     Ordered        10/30/22 1701  Place in Observation  Once                      Discharge Date: 10/31/22    Medical Problems             Resolved Problems  Date Reviewed: 10/31/2022   None                 Consultations During Hospital Stay:  None    Procedures Performed:   XR chest portable Result Date: 10/31/2022  · Narrative: CHEST INDICATION:   Shortness of breath  COMPARISON:  Chest x-ray dated November 30, 2018  EXAM PERFORMED/VIEWS:  XR CHEST PORTABLE FINDINGS: Cardiomediastinal silhouette appears unremarkable  There is mild right basilar atelectasis with no focal infiltrate or consolidation  No pneumothorax or pleural effusion  Osseous structures appear within normal limits for patient age  Impression: No acute cardiopulmonary disease  Workstation performed: [id number]   · CTA ed chest pe study Result Date: 10/30/2022  · Narrative: CTA - CHEST WITH IV CONTRAST - PULMONARY ANGIOGRAM INDICATION:    Shortness of breath  COMPARISON: None  TECHNIQUE: CTA examination of the chest was performed using angiographic technique according to a protocol specifically tailored to evaluate for pulmonary embolism  Axial, sagittal, and coronal 2D reformatted images were created from the source data and  submitted for interpretation    In addition, coronal 3D MIP postprocessing was performed on the acquisition scanner  Radiation dose length product (DLP) for this visit:  567 mGy-cm   This examination, like all CT scans performed in the [facility], was performed utilizing techniques to minimize radiation dose exposure, including the use of iterative reconstruction and automated exposure control  IV Contrast:  100 mL of iohexol (OMNIPAQUE)  FINDINGS: PULMONARY ARTERIAL TREE:  No pulmonary embolus is seen  LUNGS:  Patent central airways  Groundglass opacities in the right middle lobe (series 3, image 72; series 603, image 134)  Few triangularly shaped nodules measuring up to 0 5 cm in the right lower lobe are most compatible with an intrapulmonary lymph nodes (series 3, image 85)  PLEURA:  Unremarkable  HEART/GREAT VESSELS:  Unremarkable for patient's age  No thoracic aortic aneurysm  MEDIASTINUM AND KOFI:  Unremarkable  CHEST WALL AND LOWER NECK:   Unremarkable  VISUALIZED STRUCTURES IN THE UPPER ABDOMEN:  Cholecystectomy clips  Suture lines along the stomach reflecting known gastric bypass surgery  OSSEOUS STRUCTURES:  No acute fracture or destructive osseous lesion  Impression: 1  No pulmonary embolism  2   Right middle lobe groundglass opacities can represent atelectasis or (viral) pneumonia  However, noted is made of patient's negative COVID 23, influenza, and RSV results  Patient's leukocytosis is noted  Workstation performed: [id number]     Significant Findings / Test Results:   · As noted above     Incidental Findings:   · None  Test Results Pending at Discharge (will require follow up): · None     Outpatient Tests Requested:  · At discretion of PCP and or Outpatient pulmonology teams    Complications:  None    Past Medical History:   Diagnosis Date   • Asthma        Reason for Admission: Shortness of Breath (Pt states asthma attack started yesterday   Went to hospital yesterday but still sob today )       Hospital Course: Gagandeep Alvarenga is a 41 y o  female patient with past medical history as noted above who originally presented to the hospital on 10/30/2022 due to Shortness of Breath (Pt states asthma attack started yesterday  Went to hospital yesterday but still sob today )     Patient reports that she has had worsening shortness of breath  Was recently seen at [facility] and discharged with Zithromax and steroids however unfortunately given inability to control cough reporting worsening shortness of breath and presented to the emergency department  She was given nebulizers, inhalers, and IV steroids  She was noted to be hypoxic on room air requiring supple oxygen  RT evaluated prior to discharge and recommended home oxygen which was arranged for home  Ambulatory referral to outpatient pulmonologist for ongoing respiratory surveillance  See rest of plan as noted under individual problem above      Please see above list of diagnoses and related plan for additional information  Condition at Discharge: stable     Discharge Day Visit / Exam:     Subjective:  Reports significant improvement in requesting to go home  Vitals: Blood Pressure: 165/91 (10/31/22 0515)  Pulse: (!) 108 (10/31/22 1200)  Temperature: 97 6 °F (36 4 °C) (10/30/22 1409)  Temp Source: Temporal (10/30/22 1409)  Respirations: 20 (10/31/22 1200)  Weight - Scale: 113 kg (249 lb 12 5 oz) (10/30/22 1409)  SpO2: 93 % (10/31/22 1200)  Exam:   Physical Exam  Vitals and nursing note reviewed  Constitutional:       General: She is not in acute distress  Appearance: She is well-developed  HENT:      Head: Normocephalic and atraumatic  Eyes:      Conjunctiva/sclera: Conjunctivae normal    Cardiovascular:      Rate and Rhythm: Normal rate and regular rhythm  Pulses: Normal pulses  Heart sounds: No murmur heard  Pulmonary:      Effort: Pulmonary effort is normal  No respiratory distress  Breath sounds: Wheezing present  Abdominal:      Palpations: Abdomen is soft  Tenderness: There is no abdominal tenderness  Musculoskeletal:      Cervical back: Neck supple  Skin:     General: Skin is warm and dry  Capillary Refill: Capillary refill takes less than 2 seconds  Neurological:      Mental Status: She is alert and oriented to person, place, and time  Psychiatric:         Mood and Affect: Mood normal          Behavior: Behavior normal          Discussion with Family:  Decline    Discharge instructions/Information to patient and family:   See after visit summary for information provided to patient and family  Provisions for Follow-Up Care:  See after visit summary for information related to follow-up care and any pertinent home health orders  Disposition:     Home     Discharge Statement:  I spent 45 minutes discharging the patient  This time was spent on the day of discharge  I had direct contact with the patient on the day of discharge  Greater than 50% of the total time was spent examining patient, answering all patient questions, arranging and discussing plan of care with patient as well as directly providing post-discharge instructions  Additional time then spent on discharge activities  Discharge Medications:  See after visit summary for reconciled discharge medications provided to patient and family        ** Please Note: This note has been constructed using a voice recognition system **

## 2022-10-31 NOTE — DISCHARGE INSTR - OTHER ORDERS
Your goal pulse oximetry is 88 - 92%  If your pulse ox is <88 - rest for 5 minutes and take deep breaths through your nose with the oxygen in place  Make sure your finger is warm (cold fingers will give falsely low readings)  After 5 minutes, recheck your pulse ox  If your pulse ox continues to be below < 88% and you feel short of breath, rest, breathe through your nose and call your primary care physician or pulmonologist 24/7 (if after office hours the answering service will contact the on call physician who will call you back)  If you continue to feel excessively short of breath (much more than your normal breathing pattern), consider further evaluation in the emergency department - remember that a mask must be worn to enter any [facility] Emergency Department  If your pulse ox continues to be below < 88% and you do not feel short of breath increase your oxygen flow by 1 liter at a time to achieve a reading between 88 and 92%  If you are needing more than 2 liters higher than your normal flow for more than a few hours call your primary care physician or pulmonologist, even if you are not feeling short of breath  If your pulse ox is >92% it is safe to turn down oxygen by 1 liter at a time to achieve a reading of 88-92%       You are to be using Oxygen 2L at all times

## 2022-10-31 NOTE — ED NOTES
Patients ambulatory pulse ox on RA started at 88% and decreased to 84% with a heart rate of 132  Inpatients provider made aware  Patient still requesting to go home  Patient agreeable to discharge with at home O2 with the understanding to come back to ED if symptoms worsen        Jamee Babinski, RN  10/31/22 3573

## 2022-10-31 NOTE — ASSESSMENT & PLAN NOTE
· Random blood glucose of 169 on blood work; no known history of diabetes  · Likely in the setting of steroid use  · Follow-up outpatient primary care doctor

## 2022-10-31 NOTE — UTILIZATION REVIEW
Initial Clinical Review    Admission: Date/Time/Statement:   Admission Orders (From admission, onward)     Ordered        10/30/22 1701  Place in Observation  Once                      Orders Placed This Encounter   Procedures   • Place in Observation     Standing Status:   Standing     Number of Occurrences:   1     Order Specific Question:   Level of Care     Answer:   Med Surg [16]     ED Arrival Information     Expected   -    Arrival   10/30/2022 14:05    Acuity   Urgent            Means of arrival   Walk-In    Escorted by   Family Member    Service   Hospitalist    Admission type   Emergency            Arrival complaint   asthma attack            Chief Complaint   Patient presents with   • Shortness of Breath     Pt states asthma attack started yesterday  Went to hospital yesterday but still sob today  Initial Presentation: 41 y o  female with a PMH of asthma, anemia who presents with worsening shortness of breath for 1 day  Patient reports ports nephew at home with URI symptoms  Started having dry cough and runny nose and shortness of breath yesterday  Went to the emergency room at [facility] and was discharged on tapering prednisone this morning at 1:30 a m   Subsequently upon going home had continued shortness of breath and wheezing thereby prompting her to return back to the emergency room  Oxygen saturation 89% in room air   Plan: Observation for moderate persistent asthma with acute exacerbation, SIRS, acute resp failure with hypoxia: IV solu medrol, DuoNebs scheduled q 6 hrs, follow CBC, currently on 2 L of supplemental oxygen but desaturates in the mid 80s on ambulation on room air, wean and titrate to keep saturations between 88-94%    ED Triage Vitals   Temperature Pulse Respirations Blood Pressure SpO2   10/30/22 1409 10/30/22 1409 10/30/22 1409 10/30/22 1410 10/30/22 1409   97 6 °F (36 4 °C) (!) 130 (!) 30 (!) 187/108 (!) 89 %      Temp Source Heart Rate Source Patient Position - Orthostatic VS BP Location FiO2 (%)   10/30/22 1409 10/30/22 1409 10/30/22 1700 10/30/22 1700 --   Temporal Monitor Lying Right arm       Pain Score       10/30/22 1500       No Pain          Wt Readings from Last 1 Encounters:   10/30/22 113 kg (249 lb 12 5 oz)     Additional Vital Signs:     Date/Time Temp Pulse Resp BP MAP (mmHg) SpO2 Calculated FIO2 (%) - Nasal Cannula Nasal Cannula O2 Flow Rate (L/min) O2 Device   10/31/22 0745 -- 97 20 -- -- 95 % 28 2 L/min Nasal cannula   10/31/22 0515 -- 89 18 165/91 122 94 % 28 2 L/min Nasal cannula   10/31/22 0400 -- 91 18 132/78 -- 94 % -- -- --   10/30/22 2200 -- 108 Abnormal  18 154/88 111 93 % -- -- --   10/30/22 1900 -- 115 Abnormal  18 155/78 -- 93 % 28 2 L/min Nasal cannula   10/30/22 1700 -- 109 Abnormal  16 134/68 -- 94 % 28 2 L/min Nasal cannula   10/30/22 1521 -- -- -- -- -- 92 % 28 2 L/min Nasal cannula   10/30/22 1519 -- -- -- -- -- 89 % Abnormal  -- -- None (Room air)   10/30/22 1500 -- 115 Abnormal  18 149/80 -- 93 % -- -- None (Room air)   10/30/22 1421 -- -- -- -- -- -- -- -- None (Room air)   10/30/22 1419 -- -- -- -- -- 98 % 28 2 L/min Nasal cannula   10/30/22 1415 -- 117 Abnormal  -- 187/108 Abnormal  137 95 % 32 3 L/min Nasal cannula   10/30/22 1410 -- -- -- 187/108 Abnormal  -- -- -- -- --       Pertinent Labs/Diagnostic Test Results:   CTA ed chest pe study   Final Result by Mary Campos MD (10/30 1645)      1  No pulmonary embolism  2   Right middle lobe groundglass opacities can represent atelectasis or (viral) pneumonia  However, noted is made of patient's negative COVID 23, influenza, and RSV results  Patient's leukocytosis is noted  Workstation performed: [id number]         XR chest portable   ED Interpretation by lBas Williamson MD (10/30 1446)   No acute finding      Final Result by Eli Ward MD (10/31 0601)      No acute cardiopulmonary disease                    Workstation performed: [id number] Results from last 7 days   Lab Units 10/30/22  1417   SARS-COV-2  Negative     Results from last 7 days   Lab Units 10/31/22  0511 10/30/22  1805 10/30/22  1417   WBC Thousand/uL 20 94*  --  21 95*   HEMOGLOBIN g/dL 12 6  --  13 3   HEMATOCRIT % 36 2  --  37 1   PLATELETS Thousands/uL 405* 486* 514*         Results from last 7 days   Lab Units 10/31/22  0511 10/30/22  1417   SODIUM mmol/L 139 137   POTASSIUM mmol/L 5 2 4 2   CHLORIDE mmol/L 104 102   CO2 mmol/L 27 26   ANION GAP mmol/L 8 9   BUN mg/dL 7 6   CREATININE mg/dL 0 80 0 92   EGFR ml/min/1 73sq m 89 75   CALCIUM mg/dL 9 1 9 6     Results from last 7 days   Lab Units 10/30/22  1417   AST U/L 18   ALT U/L 15   ALK PHOS U/L 118*   TOTAL PROTEIN g/dL 7 8   ALBUMIN g/dL 4 0   TOTAL BILIRUBIN mg/dL 0 54         Results from last 7 days   Lab Units 10/31/22  0511 10/30/22  1417   GLUCOSE RANDOM mg/dL 155* 169*         Results from last 7 days   Lab Units 10/30/22  1805   PROCALCITONIN ng/ml <0 05     Results from last 7 days   Lab Units 10/30/22  1805   LACTIC ACID mmol/L 1 3         Results from last 7 days   Lab Units 10/30/22  1417   INFLUENZA A PCR  Negative   INFLUENZA B PCR  Negative   RSV PCR  Negative         Results from last 7 days   Lab Units 10/30/22  1417   TOTAL COUNTED  100           ED Treatment:   Medication Administration from 10/30/2022 1405 to 10/31/2022 0852       Date/Time Order Dose Route Action     10/30/2022 1419 albuterol inhalation solution 10 mg 10 mg Nebulization Given     10/30/2022 1419 ipratropium (ATROVENT) 0 02 % inhalation solution 1 mg 1 mg Nebulization Given     10/30/2022 1419 sodium chloride 0 9 % inhalation solution 3 mL 3 mL Nebulization Given     10/30/2022 1610 iohexol (OMNIPAQUE) 350 MG/ML injection (SINGLE-DOSE) 100 mL 100 mL Intravenous Given     10/30/2022 1834 acetaminophen (TYLENOL) tablet 650 mg 650 mg Oral Given     10/30/2022 1800 guaiFENesin (MUCINEX) 12 hr tablet 600 mg 600 mg Oral Given     10/31/2022 0757 budesonide-formoterol (SYMBICORT) 80-4 5 MCG/ACT inhaler 2 puff 2 puff Inhalation Given     10/30/2022 2005 budesonide-formoterol (SYMBICORT) 80-4 5 MCG/ACT inhaler 2 puff 2 puff Inhalation Given     10/30/2022 2201 montelukast (SINGULAIR) tablet 10 mg 10 mg Oral Given     10/31/2022 0822 ipratropium (ATROVENT) 0 02 % inhalation solution 0 5 mg 0 5 mg Nebulization Given     10/30/2022 2007 ipratropium (ATROVENT) 0 02 % inhalation solution 0 5 mg 0 5 mg Nebulization Given     10/31/2022 0822 levalbuterol (Reather Patron) inhalation solution 1 25 mg 1 25 mg Nebulization Given     10/30/2022 2007 levalbuterol (Reather Patron) inhalation solution 1 25 mg 1 25 mg Nebulization Given     10/31/2022 0514 methylPREDNISolone sodium succinate (Solu-MEDROL) injection 40 mg 40 mg Intravenous Given     10/30/2022 2332 methylPREDNISolone sodium succinate (Solu-MEDROL) injection 40 mg 40 mg Intravenous Given     10/30/2022 1800 methylPREDNISolone sodium succinate (Solu-MEDROL) injection 40 mg 40 mg Intravenous Given     10/31/2022 0520 albuterol inhalation solution 2 5 mg 2 5 mg Nebulization Given     10/30/2022 2201 albuterol inhalation solution 2 5 mg 2 5 mg Nebulization Given     10/30/2022 1852 albuterol inhalation solution 2 5 mg 2 5 mg Nebulization Given     10/31/2022 0616 benzonatate (TESSALON PERLES) capsule 100 mg 100 mg Oral Given        Past Medical History:   Diagnosis Date   • Asthma      Present on Admission:  **None**      Admitting Diagnosis: SOB (shortness of breath) [R06 02]  Age/Sex: 41 y o  female  Admission Orders:  Scheduled Medications:  budesonide-formoterol, 2 puff, Inhalation, BID  enoxaparin, 40 mg, Subcutaneous, Daily  guaiFENesin, 600 mg, Oral, BID  ipratropium, 0 5 mg, Nebulization, TID  levalbuterol, 1 25 mg, Nebulization, TID  methylPREDNISolone sodium succinate, 40 mg, Intravenous, Q6H KEMI  montelukast, 10 mg, Oral, HS      Continuous IV Infusions:     PRN Meds:  acetaminophen, 650 mg, Oral, Q6H PRN  albuterol, 2 5 mg, Nebulization, Q2H PRN  benzonatate, 100 mg, Oral, TID PRN        None    Network Utilization Review Department  ATTENTION: Please call with any questions or concerns to [phone] and carefully listen to the prompts so that you are directed to the right person  All voicemails are confidential   Katalina Eller all requests for admission clinical reviews, approved or denied determinations and any other requests to dedicated fax number below belonging to the campus where the patient is receiving treatment   List of dedicated fax numbers for the Facilities:  [address] DENIALS (Administrative/Medical Necessity) [phone]   [address] (Maternity/NICU/Pediatrics) [phone]   [address] Alyssa Perez [phone]   [facility] [place] [address] [phone]   [address] [address] [facility] [phone]   [address] [facility] [address] [facility] 134 815 [facility] [phone]

## 2022-10-31 NOTE — RESPIRATORY THERAPY NOTE
Home Oxygen Qualifying Test     Patient name: Katia Marin        : 1975   Date of Test:  2022  Diagnosis:    Home Oxygen Test:    **Medicare Guidelines require item(s) 1-5 on all ambulatory patients or 1 and 2 on non-ambulatory patients  1  Baseline SPO2 on Room Air at rest 88 %   a  If <= 88% on Room Air add O2 via NC to obtain SpO2 >=88%  If LPM needed, document LPM NA needed to reach =>88%    2  SPO2 during exertion on Room Air 84  %  a  During exertion monitor SPO2  If SPO2 increases >=89%, do not add supplemental oxygen    3  SPO2 on Oxygen at Rest 92 % at 2 LPM    4  SPO2 during exertion on Oxygen 90 % at 2 LPM    5  Test performed during exertion activity  [x]  Supplemental Home Oxygen is indicated  []  Client does not qualify for home oxygen  Respiratory Additional Notes- Pt will require 2 L NC at all times       Kurt Ferrari, RT

## 2022-10-31 NOTE — ASSESSMENT & PLAN NOTE
· Present on admission as evidenced by tachycardia, tachypnea, leukocytosis  · COVID influenza and RSV swab negative on admission  · CT chest reviewed  · Likely in the setting of acute asthma exacerbation    Leukocytosis can be secondary to recent IV steroids patient received in the emergency room yesterday  · Continue to follow CBC temperature curve closely  · Labs reviewed  · No indication for additional antibiotics at this time; did recommend continuation of Zithromax upon discharge

## 2022-10-31 NOTE — ASSESSMENT & PLAN NOTE
· Patient presents with worsening shortness of breath and cough  · Tachypneic tachycardic with increased work of breathing on admission  · Oxygen saturation 89% on room air  · Currently on 2 L of supplemental oxygen but desaturates in the mid 80s on ambulation on room air  · Home O2 qualifying for oxygen  · Low suspicion for indefinite oxygen use  · Ongoing outpatient follow-up primary care and pulmonology

## 2022-10-31 NOTE — ED NOTES
Supplemental nasal cannula O2 discontinued at this time  Will continue to monitor patients O2 saturation and document an ambulatory pulse Ox as well        Dick Christie RN  10/31/22 2019

## 2022-10-31 NOTE — ASSESSMENT & PLAN NOTE
Background:  Presented to the ED with worsening shortness of breath and cough  Of note was in the ED [facility] yesterday with exacerbation  Received IV steroids and discharged on tapering prednisone and azithromycin  · Known history of asthma maintained on Singulair and albuterol inhaler   · Does not follow up with pulmonologist  · Chest x-ray on admission without acute infiltrate  · CT chest shows:  No pulmonary embolism  Right middle lobe groundglass opacities can represent atelectasis or (viral) pneumonia  · Continue with scheduled IV Solu-Medrol, scheduled DuoNebs q 6 hours, Singulair  · Ambulatory referral to pulmonology at discharge  · Home O2 evaluation qualifying for oxygen  · Arranged with Case Management prior to discharge  · Sent on steroid taper as well as continuation of Zithromax per prior prescription  · Also wrote prescriptions for nebulizer as patient did not use her nebulizer at home

## 2022-11-01 ENCOUNTER — TELEPHONE (OUTPATIENT)
Dept: CARDIOLOGY CLINIC | Facility: CLINIC | Age: 47
End: 2022-11-01

## 2022-11-01 LAB

## 2022-11-10 LAB
DME PARACHUTE DELIVERY DATE ACTUAL: NORMAL
DME PARACHUTE DELIVERY DATE REQUESTED: NORMAL
DME PARACHUTE DELIVERY NOTE: NORMAL
DME PARACHUTE ITEM DESCRIPTION: NORMAL
DME PARACHUTE ORDER STATUS: NORMAL
DME PARACHUTE SUPPLIER NAME: NORMAL
DME PARACHUTE SUPPLIER PHONE: NORMAL

## 2023-10-23 ENCOUNTER — APPOINTMENT (EMERGENCY)
Dept: RADIOLOGY | Facility: HOSPITAL | Age: 48
End: 2023-10-23
Payer: COMMERCIAL

## 2023-10-23 ENCOUNTER — HOSPITAL ENCOUNTER (EMERGENCY)
Facility: HOSPITAL | Age: 48
Discharge: HOME | End: 2023-10-23
Attending: EMERGENCY MEDICINE | Admitting: EMERGENCY MEDICINE
Payer: COMMERCIAL

## 2023-10-23 VITALS
RESPIRATION RATE: 18 BRPM | BODY MASS INDEX: 33.6 KG/M2 | TEMPERATURE: 97 F | OXYGEN SATURATION: 95 % | WEIGHT: 240 LBS | HEIGHT: 71 IN | DIASTOLIC BLOOD PRESSURE: 107 MMHG | SYSTOLIC BLOOD PRESSURE: 142 MMHG | HEART RATE: 91 BPM

## 2023-10-23 DIAGNOSIS — M25.561 RIGHT KNEE PAIN: Primary | ICD-10-CM

## 2023-10-23 PROCEDURE — 99284 EMERGENCY DEPT VISIT MOD MDM: CPT | Performed by: PHYSICIAN ASSISTANT

## 2023-10-23 PROCEDURE — 99283 EMERGENCY DEPT VISIT LOW MDM: CPT

## 2023-10-23 PROCEDURE — 73564 X-RAY EXAM KNEE 4 OR MORE: CPT

## 2023-10-23 RX ORDER — ALBUTEROL SULFATE 90 UG/1
2 AEROSOL, METERED RESPIRATORY (INHALATION) EVERY 6 HOURS PRN
COMMUNITY

## 2023-10-23 NOTE — DISCHARGE INSTRUCTIONS
These rest the knee, ice and elevate when possible. Use cane.   Follow-up with orthopedic specialist.  Please return for new or worsening symptoms

## 2023-10-23 NOTE — ED PROVIDER NOTES
History  Chief Complaint   Patient presents with    Knee Pain     Pt reports playing with grandson approx 2 weeks ago and twisted her R knee inward, since then pt is having increased swelling and pain. 47-year-old female presented to the emergency department for evaluation of right knee pain. Patient states 2 weeks ago she was on the floor playing with her grandson. Patient states she twisted the knee wrong and has had discomfort since. Reports mild swelling. Reports history of meniscus injury that is postrepair x2 in 1993 and 2005. Patient states she has been told in the past she will need a total knee replacement but has been trying to put this off. She denies and redness. No fevers. Prior to Admission Medications   Prescriptions Last Dose Informant Patient Reported? Taking? albuterol (PROVENTIL HFA,VENTOLIN HFA) 90 mcg/act inhaler   Yes No   Sig: Inhale 2 puffs every 6 (six) hours as needed for wheezing   benzonatate (TESSALON PERLES) 100 mg capsule   No Yes   Sig: Take 1 capsule (100 mg total) by mouth 3 (three) times a day as needed for cough   budesonide-formoterol (SYMBICORT) 80-4.5 MCG/ACT inhaler   No Yes   Sig: Inhale 2 puffs 2 (two) times a day Rinse mouth after use.    ferrous sulfate 325 (65 Fe) mg tablet   Yes Yes   Sig: Take 325 mg by mouth daily with breakfast   ipratropium (ATROVENT) 0.02 % nebulizer solution   No No   Sig: Take 2.5 mL (0.5 mg total) by nebulization 2 (two) times a day   loratadine (CLARITIN) 10 mg tablet   Yes Yes   Sig: Take 10 mg by mouth daily   montelukast (SINGULAIR) 10 mg tablet   No Yes   Sig: Take 1 tablet (10 mg total) by mouth daily at bedtime      Facility-Administered Medications: None       Past Medical History:   Diagnosis Date    Asthma        Past Surgical History:   Procedure Laterality Date    CHOLECYSTECTOMY      GASTRIC BYPASS         Family History   Problem Relation Age of Onset    No Known Problems Mother     No Known Problems Father     No Known Problems Daughter     Lung cancer Maternal Grandmother     Heart failure Paternal Grandmother     Lung cancer Paternal Uncle     Diabetes Paternal Aunt      I have reviewed and agree with the history as documented. E-Cigarette/Vaping    E-Cigarette Use Never User      E-Cigarette/Vaping Substances     Social History     Tobacco Use    Smoking status: Never    Smokeless tobacco: Never   Vaping Use    Vaping Use: Never used   Substance Use Topics    Alcohol use: Yes    Drug use: Yes     Types: Marijuana       Review of Systems   Constitutional:  Negative for appetite change, fatigue and fever. Respiratory: Negative. Cardiovascular: Negative. Musculoskeletal:  Positive for arthralgias and joint swelling. Skin: Negative. Neurological: Negative. All other systems reviewed and are negative. Physical Exam  Physical Exam  Vitals and nursing note reviewed. Constitutional:       General: She is not in acute distress. Appearance: Normal appearance. She is not ill-appearing, toxic-appearing or diaphoretic. HENT:      Head: Normocephalic. Eyes:      Conjunctiva/sclera: Conjunctivae normal.   Pulmonary:      Effort: Pulmonary effort is normal.   Musculoskeletal:         General: Tenderness present. No swelling or deformity. Normal range of motion. Right knee: Swelling and bony tenderness present. No deformity, effusion or erythema. Normal range of motion. Tenderness present. No LCL laxity or MCL laxity. Comments: Well-healed surgical scars on the right knee. Minimal swelling. No effusion. No deformities. Tenderness throughout. No laxity. No redness or warmth. Skin:     General: Skin is warm and dry. Findings: No bruising or erythema. Neurological:      General: No focal deficit present. Mental Status: She is alert.          Vital Signs  ED Triage Vitals [10/23/23 1724]   Temperature Pulse Respirations Blood Pressure SpO2   (!) 97 °F (36.1 °C) 91 18 (!) 142/107 95 %      Temp Source Heart Rate Source Patient Position - Orthostatic VS BP Location FiO2 (%)   Temporal Monitor Sitting Left arm --      Pain Score       6           Vitals:    10/23/23 1724   BP: (!) 142/107   Pulse: 91   Patient Position - Orthostatic VS: Sitting         Visual Acuity      ED Medications  Medications - No data to display    Diagnostic Studies  Results Reviewed       None                   XR knee 4+ vw right injury    (Results Pending)              Procedures  Procedures         ED Course                               SBIRT 20yo+      Flowsheet Row Most Recent Value   Initial Alcohol Screen: US AUDIT-C     1. How often do you have a drink containing alcohol? 0 Filed at: 10/23/2023 1753   2. How many drinks containing alcohol do you have on a typical day you are drinking? 0 Filed at: 10/23/2023 1753   3b. FEMALE Any Age, or MALE 65+: How often do you have 4 or more drinks on one occassion? 0 Filed at: 10/23/2023 1753   Audit-C Score 0 Filed at: 10/23/2023 1753   JEFFERY: How many times in the past year have you. .. Used an illegal drug or used a prescription medication for non-medical reasons? Never Filed at: 10/23/2023 1753                      Medical Decision Making  82-year-old female presents to the emergency department for evaluation of right knee pain. Vitals and medical record reviewed. Patient at risk for the following but not limited to sprain, fracture, contusion, dislocation. ED interpretation of x-ray was negative for acute osseous injury. Negative for arthritic changes. Will treat with Ace wrap and walking aid. She will have patient follow-up with orthopedics for continued care and follow-up. We discussed return precautions and she verbalized understanding. She is clinically and hemodynamically stable for discharge. Amount and/or Complexity of Data Reviewed  Radiology: ordered.              Disposition  Final diagnoses:   Right knee pain     Time reflects when diagnosis was documented in both MDM as applicable and the Disposition within this note       Time User Action Codes Description Comment    10/23/2023  6:11 PM María Elena Medellin Add [T19.672] Right knee pain           ED Disposition       ED Disposition   Discharge    Condition   Stable    Date/Time   Mon Oct 23, 2023 2901 Saurabh Perez discharge to home/self care.                    Follow-up Information       Follow up With Specialties Details Why [address], DO    [address] [facility] [address]  [address] [facility] Orthopedic Surgery   [address] [facility]  [address]  [address] [facility] [address] [phone]              Discharge Medication List as of 10/23/2023  6:14 PM        CONTINUE these medications which have NOT CHANGED    Details   benzonatate (TESSALON PERLES) 100 mg capsule Take 1 capsule (100 mg total) by mouth 3 (three) times a day as needed for cough, Starting Mon 10/31/2022, Normal      budesonide-formoterol (SYMBICORT) 80-4.5 MCG/ACT inhaler Inhale 2 puffs 2 (two) times a day Rinse mouth after use., Starting Mon 10/31/2022, Normal      ferrous sulfate 325 (65 Fe) mg tablet Take 325 mg by mouth daily with breakfast, Historical Med      loratadine (CLARITIN) 10 mg tablet Take 10 mg by mouth daily, Historical Med      montelukast (SINGULAIR) 10 mg tablet Take 1 tablet (10 mg total) by mouth daily at bedtime, Starting Mon 10/31/2022, Until Mon 10/23/2023, Normal      albuterol (PROVENTIL HFA,VENTOLIN HFA) 90 mcg/act inhaler Inhale 2 puffs every 6 (six) hours as needed for wheezing, Historical Med      ipratropium (ATROVENT) 0.02 % nebulizer solution Take 2.5 mL (0.5 mg total) by nebulization 2 (two) times a day, Starting Mon 10/31/2022, Until Wed 11/30/2022, Normal                 PDMP Review       None            ED Provider  Electronically Signed by             María Elena Medellin PA-C  10/23/23 1941

## 2023-10-27 ENCOUNTER — OFFICE VISIT (OUTPATIENT)
Dept: OBGYN CLINIC | Facility: CLINIC | Age: 48
End: 2023-10-27
Payer: COMMERCIAL

## 2023-10-27 VITALS
SYSTOLIC BLOOD PRESSURE: 145 MMHG | DIASTOLIC BLOOD PRESSURE: 90 MMHG | HEART RATE: 78 BPM | HEIGHT: 71 IN | TEMPERATURE: 98 F | BODY MASS INDEX: 35 KG/M2 | WEIGHT: 250 LBS

## 2023-10-27 DIAGNOSIS — E66.9 OBESITY (BMI 30-39.9): ICD-10-CM

## 2023-10-27 DIAGNOSIS — M25.561 CHRONIC PAIN OF RIGHT KNEE: ICD-10-CM

## 2023-10-27 DIAGNOSIS — M17.11 PRIMARY OSTEOARTHRITIS OF RIGHT KNEE: Primary | ICD-10-CM

## 2023-10-27 DIAGNOSIS — G89.29 CHRONIC PAIN OF RIGHT KNEE: ICD-10-CM

## 2023-10-27 PROCEDURE — 20610 DRAIN/INJ JOINT/BURSA W/O US: CPT | Performed by: ORTHOPAEDIC SURGERY

## 2023-10-27 PROCEDURE — 99204 OFFICE O/P NEW MOD 45 MIN: CPT | Performed by: ORTHOPAEDIC SURGERY

## 2023-10-27 RX ORDER — BUPIVACAINE HYDROCHLORIDE 2.5 MG/ML
4 INJECTION, SOLUTION INFILTRATION; PERINEURAL
Status: COMPLETED | OUTPATIENT
Start: 2023-10-27 | End: 2023-10-27

## 2023-10-27 RX ORDER — MULTIVITAMIN
1 TABLET ORAL DAILY
COMMUNITY

## 2023-10-27 RX ORDER — TRIAMCINOLONE ACETONIDE 40 MG/ML
40 INJECTION, SUSPENSION INTRA-ARTICULAR; INTRAMUSCULAR
Status: COMPLETED | OUTPATIENT
Start: 2023-10-27 | End: 2023-10-27

## 2023-10-27 RX ADMIN — BUPIVACAINE HYDROCHLORIDE 4 ML: 2.5 INJECTION, SOLUTION INFILTRATION; PERINEURAL at 09:00

## 2023-10-27 RX ADMIN — TRIAMCINOLONE ACETONIDE 40 MG: 40 INJECTION, SUSPENSION INTRA-ARTICULAR; INTRAMUSCULAR at 09:00

## 2023-10-27 NOTE — PROGRESS NOTES
ASSESSMENT/PLAN:    Diagnoses and all orders for this visit:    Primary osteoarthritis of right knee    Chronic pain of right knee  -     Ambulatory Referral to Orthopedic Surgery    Obesity (BMI 30-39. 9)    Other orders  -     Multiple Vitamin (multivitamin) tablet; Take 1 tablet by mouth daily  -     psyllium (METAMUCIL) 0.52 g capsule; Take 0.52 g by mouth daily        Plan: Treatment was discussed. She elected to proceed with injection of local anesthetic and corticosteroid. This was performed without difficulty and tolerated well. I will see her in 2 to 3 weeks for reevaluation. She is to contact me if questions or concerns arise. Viscosupplementation could be considered if the corticosteroid injection does not provide adequate relief. She was informed that she has advanced degenerative disease and knee replacement arthroplasty may be warranted. She will discuss this with me at her follow-up but would want to wait until after the holidays if she were to decide to proceed with knee replacement. Return for 2 - 3 Weeks. _____________________________________________________  CHIEF COMPLAINT:  Chief Complaint   Patient presents with    Right Knee - Pain         SUBJECTIVE:  Kelton Chapin is a 57y.o. year old female who presents for evaluation of her right knee. She has had a long history of right knee complaints and has had multiple surgeries for meniscal pathology. Her last surgery was performed approximately 15 years ago. She was last seen for her right knee by an orthopedic surgeon approximately 8 years ago when she underwent corticosteroid and Visco supplement injections. She was informed that she would likely need a knee replacement but at that time did not want to proceed with knee replacement.   She has had persistent arthritic type symptoms in the knee with some morning stiffness, generalized achiness of the knee but was functioning well up until about 3 weeks ago when she was playing with her grandson, twisted and felt a pop in her right knee. She had difficulty moving the knee for period of time and has had persistent pain over the last 3 weeks, greater than her baseline. She continues to be unable to sit in the "Connie style" position which she was able to do prior to this episode. She was seen in the emergency room at [facility] recently, x-rays obtained and she was referred for orthopedic evaluation and treatment. She complains of diffuse pain located over the anterior knee and radiating laterally and posteriorly. She has had no treatment for a minimum of 6 to 8 years. She denies any left knee complaints.     PAST MEDICAL HISTORY:  Past Medical History:   Diagnosis Date    Asthma        PAST SURGICAL HISTORY:  Past Surgical History:   Procedure Laterality Date    CHOLECYSTECTOMY      GASTRIC BYPASS      KNEE SURGERY Right     WRIST SURGERY Right        FAMILY HISTORY:  Family History   Problem Relation Age of Onset    No Known Problems Mother     No Known Problems Father     No Known Problems Daughter     Lung cancer Maternal Grandmother     Heart failure Paternal Grandmother     Lung cancer Paternal Uncle     Diabetes Paternal Aunt        SOCIAL HISTORY:  Social History     Tobacco Use    Smoking status: Never    Smokeless tobacco: Never   Vaping Use    Vaping Use: Never used   Substance Use Topics    Alcohol use: Yes    Drug use: Yes     Types: Marijuana       MEDICATIONS:    Current Outpatient Medications:     albuterol (PROVENTIL HFA,VENTOLIN HFA) 90 mcg/act inhaler, Inhale 2 puffs every 6 (six) hours as needed for wheezing, Disp: , Rfl:     benzonatate (TESSALON PERLES) 100 mg capsule, Take 1 capsule (100 mg total) by mouth 3 (three) times a day as needed for cough, Disp: 20 capsule, Rfl: 0    budesonide-formoterol (SYMBICORT) 80-4.5 MCG/ACT inhaler, Inhale 2 puffs 2 (two) times a day Rinse mouth after use., Disp: 10.2 g, Rfl: 0    ferrous sulfate 325 (65 Fe) mg tablet, Take 325 mg by mouth daily with breakfast, Disp: , Rfl:     ipratropium (ATROVENT) 0.02 % nebulizer solution, Take 2.5 mL (0.5 mg total) by nebulization 2 (two) times a day, Disp: 150 mL, Rfl: 0    loratadine (CLARITIN) 10 mg tablet, Take 10 mg by mouth daily, Disp: , Rfl:     montelukast (SINGULAIR) 10 mg tablet, Take 1 tablet (10 mg total) by mouth daily at bedtime, Disp: 30 tablet, Rfl: 0    Multiple Vitamin (multivitamin) tablet, Take 1 tablet by mouth daily, Disp: , Rfl:     psyllium (METAMUCIL) 0.52 g capsule, Take 0.52 g by mouth daily, Disp: , Rfl:     ALLERGIES:  Allergies   Allergen Reactions    Advil Pm Liqui-Gels [Ibuprofen-Diphenhydramine Hcl] Facial Swelling    Alprazolam Other (See Comments)     See 11/06/09 telephone encounter  See 11/06/09 telephone encounter      Percocet [Oxycodone-Acetaminophen] GI Intolerance       Review of systems:   Constitutional: Negative for fatigue, fever or loss of apetite. HENT: Negative. Respiratory: Negative for shortness of breath, dyspnea. Cardiovascular: Negative for chest pain/tightness. Gastrointestinal: Negative for abdominal pain, N/V. Endocrine: Negative for cold/heat intolerance, unexplained weight loss/gain. Genitourinary: Negative for flank pain, dysuria, hematuria. Musculoskeletal: Positive as in the HPI  Skin: Negative for rash. Neurological: Negative  Psychiatric/Behavioral: Negative for agitation. _____________________________________________________  PHYSICAL EXAMINATION:    Blood pressure 145/90, pulse 78, temperature 98 °F (36.7 °C), temperature source Temporal, height 5' 11" (1.803 m), weight 113 kg (250 lb), last menstrual period 10/23/2023.     General: well developed and well nourished, alert, oriented times 3, and appears comfortable  Psychiatric: Normal  HEENT: Benign  Cardiovascular: Regular    Pulmonary: No wheezing or stridor  Abdomen: Soft, Nontender  Skin: No masses, erythema, lacerations, fluctation, ulcerations  Neurovascular: Motor and sensory exams are grossly intact and pulses palpable. MUSCULOSKELETAL EXAMINATION:    Right knee exam demonstrates full extension. She has flexion to approximately 90 degrees with complaints of pain and I was able to obtain an additional 5 degrees of passive motion but with increasing pain. She describes anterior and diffuse pain with flexion. Collateral ligaments are grossly stable. She does have a mild valgus deformity noted. She complains of diffuse lateral and medial pain of the right knee. There is hypertrophy appreciated. There is a small effusion noted. The left knee exam is benign. The remainder of the lower extremity examination bilaterally is benign. _____________________________________________________  STUDIES REVIEWED:  Nonweightbearing x-rays of the right knee obtained in the emergency room demonstrate advanced degenerative changes with essentially complete loss of joint space laterally, subchondral sclerosis and large osteophytes. There are lesser changes noted medially. There is significant degenerative changes noted at the patellofemoral joint. The x-ray report was reviewed. The ER note was reviewed. PROCEDURES:  Large joint arthrocentesis: R knee  Universal Protocol:  Consent: Verbal consent obtained.   Consent given by: patient  Patient understanding: patient states understanding of the procedure being performed  Supporting Documentation  Indications: pain   Procedure Details  Location: knee - R knee  Needle size: 22 G  Ultrasound guidance: no  Approach: lateral  Medications administered: 4 mL bupivacaine 0.25 %; 40 mg triamcinolone acetonide 40 mg/mL              Mackenzie Garcia